# Patient Record
(demographics unavailable — no encounter records)

---

## 2019-08-17 NOTE — RAD
EXAM: 3 views left ankle

 

DATE: 8/17/2019 10:58 AM

 

INDICATION: Left ankle pain, trauma

 

COMPARISON: No Prior

 

FINDINGS:

No evidence of acute fracture or dislocation. Ankle mortise is congruent. 

Talar dome is intact. No ankle joint effusion. Mild soft tissue swelling 

about the left ankle.

 

IMPRESSION:

No evidence of acute fracture or dislocation.

 

Electronically signed by: Abdiaziz Sanchez MD (8/17/2019 12:44 PM) [facility]

## 2019-08-17 NOTE — PHYS DOC
Text


Text


Will obtain ankle x-ray. Will discharge home with crutches, splint and pain 

medication with work comp follow-up.





General


Chief Complaint:  MECHANICAL FALL


Stated Complaint:  FALL


Time Seen by MD:  10:50


Source:  patient


Exam Limitations:  no limitations





History of Present Illness


Initial Comments


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 

injury after missing a step and twisting the ED for rating ankle. Pain is 

localized to left ankle. Denies other injury or complaint. Injury occurred just 

prior to ED arrival.


Onset:  just prior to arrival


Severity:  moderate


Pain/Injury Location:  left ankle


Method of Injury:  fell, twisted


Modifying Factors:  improves with movement, improves with rest


Associated Symptoms:  none





Past Medical History


Medical History:  no pertinent history


Surgical History:  noncontributory


LMP (Females 10-50):  





Family History


Significant Family History:  no pertinent family hx





Review of Systems


Constitutional:  no symptoms reported


EENTM:  no symptoms reported


Respiratory:  no symptoms reported


Cardiovascular:  no symptoms reported


Gastrointestinal:  no symptoms reported


Genitourinary:  no symptoms reported


Musculoskeletal:  see HPI


Skin:  no symptoms reported


Psychiatric/Neurological:  no symptoms reported


All Other Systems:  Reviewed and Negative





Physical Exam


General Appearance:  WD/WN, moderate distress


Neck:  normal inspection


Ankles:  left ankle limited range of motion, left ankle pain, left ankle soft 

tissue tenderness, left ankle swelling


Feet:  left foot pain, left foot soft tissue tenderness, left foot swelling


Neurologic/Tendon:  normal sensation, normal motor functions


Psychiatric:  alert, oriented x 3


Skin:  normal color











CALE GUTIERREZ DO                   Aug 17, 2019 11:04

## 2020-09-28 NOTE — RAD
EXAM: Obstetrics sonogram.

 

HISTORY: Size and dates discrepancy.

 

TECHNIQUE: Sonographic imaging of a gravid uterus was performed.

 

COMPARISON: 6/5/2020.

 

FINDINGS: There is a single intrauterine fetus in breech presentation with

a normal heart rate of 137 bpm. There is a posterior grade 1 placenta 

without evidence of placenta previa. The cervix is closed and measures 4.2

cm in length. The amniotic fluid volume is normal. There is a three-vessel

umbilical cord.

 

The fetal heart, brain, spine, kidneys, stomach, bladder, facial profile 

and extremities are unremarkable. The biparietal diameter is 5.12 cm, 

corresponding with 21 weeks and 4 days. The head circumference is 19.11 

cm, corresponding with 21 weeks and 3 days. The abdominal circumference is

16.63 cm, corresponding with 21 weeks and 5 days. The femoral length is 

3.62 cm, corresponding with 21 weeks and 3 days.

 

The estimated gestational age based on combined ultrasound measurements is

21 weeks and 4 days and the estimated fetal weight is 433 g. This 

corresponds with the 14th percentile for a gestational age of 23 weeks and

5 days based on LMP.

 

IMPRESSION:

1. Single intrauterine fetus in breech presentation with normal heart rate

and gestational age based on ultrasound measurements of 21 weeks and 4 

days. The estimated fetal weight is at the 40th percentile for a 

gestational age of 23 weeks and 5 days based on LMP.

2. Unremarkable fetal anatomy survey.

 

Electronically signed by: Salena Cano MD (9/28/2020 5:30 PM) [id number]

## 2021-02-09 NOTE — PDOC1
OB - History


Hx of Present Pregnancy


Prenatal Care:  Good Care


Ultrasounds:  Normal mid trimester US


Obstetrical Complications:  Other (Oligohydramnios)


Medical Complications:  None





Past Family/Social History


*


Past Medical, Surgical, Family and Obstetric Histories reviewed from prenatal 

chart.


Rubella:  Immune


RPR/VDRL:  Negative


GBS Status:  Positive


HBsAG:  Negative





OB - Chief Complaint & HPI


Date of Admission:


Date of Admission:  2021 at 19:49


Chief Complaint/History


:  1


Para:  0


EGA:  40


Reason for admission:  induction of labor (oligohydramnios)


Admission Nurse Assessment Rev:  Yes





OB - Admission Exam


Physical Exam


Vitals:





                          VS - Last 72 Hours, by Label








  Date Time  Temp Pulse Resp B/P (MAP) Pulse Ox O2 Delivery O2 Flow Rate FiO2


 


21 21:29 98.2 103 18 142/74 (96)  Room Air  





 98.2       








HEENT:  Normal


Heart:  Regular Rate


Lungs:  Clear


Abdomen:  Gravid, Non tender, Soft


Extremities:  Edema


Reflexes:  Normal


Cervical Dilatation:  Fingertip


Effacement:  50%


Station:  -3


Membranes:  Intact


Accelerations:  Accelerations Present


Decelerations:  No decelerations


Contractions on Admission:  None


Text


A: 40 wks IUP


    Oligohydramnios


    GBS positive


P: Admit IOL cervidil, then pitocin in am with Pen G prophylaxis for GBS status.











KEYANNA ALVARENGA Jr, MD           2021 08:10

## 2021-02-10 NOTE — PDOC
VAGINAL DELIVERY


DATE


DATE: 2/10/21 


TIME: 13:50


:  1


Para:  1


EGA:  40


VAGINAL DELIVERY:  VTX


VACCUM ASSISTED:  No


PLACENTA:  Spontaneous


APGAR


8/9


SEX:  Female


WEIGHT


Weight [ 6 lbs. 8 oz ]


Nuchal Cord:  No


Amniotic Fluid:  Clear


PAIN:  Epidural


EPISIOTOMY:  No


EXTENSION:  Yes (2nd degree midline laceration and periurethral laceration)


REPAIRED WITH


2-0 vicryl and 3-0 chromic


EBL


300 ml


COMPLICATIONS


none


CONDITION


pt. stable


Signs of Intrauterine Infectio:  None


Shoulder Dystocia:  No











KEYANNA ALVARENGA Jr, MD          Feb 10, 2021 13:51

## 2021-02-11 NOTE — PDOC
OB Progress Note


Date of Service


21


Time of Evaluation


1055


Notes


Pt. feeling well.  No complaints.


Lab





Laboratory Tests








Test


 21


07:13


 


White Blood Count


 12.9 x10^3/uL


(4.0-11.0)


 


Red Blood Count


 3.63 x10^6/uL


(3.50-5.40)


 


Hemoglobin


 9.5 g/dL


(12.0-15.5)


 


Hematocrit


 29.9 %


(36.0-47.0)


 


Mean Corpuscular Volume 82 fL () 


 


Mean Corpuscular Hemoglobin 26 pg (25-35) 


 


Mean Corpuscular Hemoglobin


Concent 32 g/dL


(31-37)


 


Red Cell Distribution Width


 15.3 %


(11.5-14.5)


 


Platelet Count


 252 x10^3/uL


(140-400)


 


Neutrophils (%) (Auto) 63 % (31-73) 


 


Lymphocytes (%) (Auto) 27 % (24-48) 


 


Monocytes (%) (Auto) 10 % (0-9) 


 


Eosinophils (%) (Auto) 0 % (0-3) 


 


Basophils (%) (Auto) 1 % (0-3) 


 


Neutrophils # (Auto)


 8.1 x10^3/uL


(1.8-7.7)


 


Lymphocytes # (Auto)


 3.4 x10^3/uL


(1.0-4.8)


 


Monocytes # (Auto)


 1.2 x10^3/uL


(0.0-1.1)


 


Eosinophils # (Auto)


 0.0 x10^3/uL


(0.0-0.7)


 


Basophils # (Auto)


 0.1 x10^3/uL


(0.0-0.2)








Laboratory Tests








Test


 21


07:13


 


White Blood Count


 12.9 x10^3/uL


(4.0-11.0)


 


Red Blood Count


 3.63 x10^6/uL


(3.50-5.40)


 


Hemoglobin


 9.5 g/dL


(12.0-15.5)


 


Hematocrit


 29.9 %


(36.0-47.0)


 


Mean Corpuscular Volume 82 fL () 


 


Mean Corpuscular Hemoglobin 26 pg (25-35) 


 


Mean Corpuscular Hemoglobin


Concent 32 g/dL


(31-37)


 


Red Cell Distribution Width


 15.3 %


(11.5-14.5)


 


Platelet Count


 252 x10^3/uL


(140-400)


 


Neutrophils (%) (Auto) 63 % (31-73) 


 


Lymphocytes (%) (Auto) 27 % (24-48) 


 


Monocytes (%) (Auto) 10 % (0-9) 


 


Eosinophils (%) (Auto) 0 % (0-3) 


 


Basophils (%) (Auto) 1 % (0-3) 


 


Neutrophils # (Auto)


 8.1 x10^3/uL


(1.8-7.7)


 


Lymphocytes # (Auto)


 3.4 x10^3/uL


(1.0-4.8)


 


Monocytes # (Auto)


 1.2 x10^3/uL


(0.0-1.1)


 


Eosinophils # (Auto)


 0.0 x10^3/uL


(0.0-0.7)


 


Basophils # (Auto)


 0.1 x10^3/uL


(0.0-0.2)








Medications





Current Medications


Sodium Chloride (Normal Saline Flush) 3 ml QSHIFT  PRN IV AFTER MEDS AND BLOOD 

DRAWS;  Start 21 at 21:30


Ringer's Solution 1,000 ml @  125 mls/hr Q8H IV  Last administered on 2/10/21at 

10:04;  Start 21 at 21:30;  Stop 2/10/21 at 23:00;  Status DC


Fentanyl Citrate (Fentanyl 2ml Vial) 50 mcg PRN Q30MIN  PRN IVP Mild to moderate

pain Last administered on 2/10/21at 07:26;  Start 21 at 21:30


Fentanyl Citrate (Fentanyl 2ml Vial) 100 mcg PRN Q30MIN  PRN IVP Severe pain;  

Start 21 at 21:30


Acetaminophen (Tylenol) 650 mg PRN Q6HRS  PRN PO MILD PAIN / TEMP > 100.3'F Last

administered on 21at 03:36;  Start 21 at 21:30


Ondansetron HCl (Zofran) 4 mg PRN Q4HRS  PRN IVP NAUSEA/VOMITING;  Start 21 

at 21:30


Al Hydroxide/Mg Hydroxide (Mylanta Plus Xs) 30 ml PRN Q4HRS  PRN PO HEARTBURN / 

GAS;  Start 21 at 21:30


Terbutaline Sulfate (Brethine) 0.25 mg 1X PRN  PRN SQ SEE COMMENTS;  Start 

21 at 21:30;  Stop 21 at 21:29;  Status DC


Lidocaine HCl (Xylocaine 1% Pf 30ml Vial) 30 ml 1X PRN  PRN INJ SEE COMMENTS 

Last administered on 2/10/21at 15:51;  Start 21 at 21:30;  Stop 2/10/21 at 

21:29;  Status DC


Oxytocin 500 ml @ 0 mls/hr CONT  PRN IV SEE I/O RECORD Last administered on 

21at 07:27;  Start 21 at 21:30


Oxytocin 500 ml @ 0 mls/hr CONT PRN  PRN IV Post delivery bleeding;  Start 

21 at 21:30


Ibuprofen (Motrin) 800 mg PRN Q6HRS  PRN PO PAIN Last administered on 2/10/21at 

20:20;  Start 21 at 21:30


Penicillin G Potassium [id number] unit/Dextrose 100 ml @  100 mls/hr 1X  ONCE IV  

Last administered on 21at 07:25;  Start 21 at 21:30;  Stop 21 at 22:

29;  Status DC


Penicillin G Potassium [id number] unit/Dextrose 50 ml @  100 mls/hr Q4H IV  Last 

administered on 2/10/21at 10:46;  Start 21 at 01:30;  Stop 2/10/21 at 23:00;

 Status DC


Docusate Sodium (Enemeez) 283 mg PRN DAILY  PRN MA CONSTIPATION;  Start 21 

at 21:30


Dinoprostone (Cervidil) 10 mg 1X  ONCE VG  Last administered on 21at 21:38; 

Start 21 at 21:30;  Stop 21 at 21:31;  Status DC


Dinoprostone (Cervidil) 10 mg 1X  ONCE VG  Last administered on 21at 17:15; 

Start 21 at 17:00;  Stop 21 at 17:01;  Status DC


Oxytocin 500 ml @ 0 mls/hr 1X  ONCE IV  Last administered on 2/10/21at 06:13;  

Start 2/10/21 at 06:00;  Stop 2/10/21 at 06:01;  Status DC


Dinoprostone (Cervidil) 10 mg 1X  ONCE VG  Last administered on 21at 22:31; 

Start 21 at 22:30;  Stop 21 at 22:31;  Status DC


Diphenhydramine HCl (Benadryl) 50 mg PRN QHS  PRN PO INSOMNIA Last administered 

on 2/10/21at 01:19;  Start 2/10/21 at 01:15


Ropivacaine (Naropin 0.2%) 10 ml STK-MED ONCE .ROUTE ;  Start 2/10/21 at 09:06; 

Stop 2/10/21 at 09:07;  Status DC


Fentanyl Citrate 50 ml @ As Directed STK-MED ONCE .ROUTE ;  Start 2/10/21 at 

09:07;  Stop 2/10/21 at 09:07;  Status DC


Fentanyl Citrate 50 ml @ As Directed STK-MED ONCE .ROUTE ;  Start 2/10/21 at 

12:31;  Stop 2/10/21 at 12:32;  Status DC


Sodium Chloride (Normal Saline Flush) 10 ml QSHIFT  PRN IV AFTER MEDS AND BLOOD 

DRAWS;  Start 2/10/21 at 14:00


Oxytocin 500 ml @  62.5 mls/hr CONT  PRN IV SEE I/O RECORD;  Start 2/10/21 at 

14:00;  Stop 2/10/21 at 21:59;  Status DC


Acetaminophen (Tylenol) 650 mg PRN Q6HRS  PRN PO MILD PAIN / TEMP > 100.3'F;  

Start 2/10/21 at 14:00


Ibuprofen (Motrin) 800 mg PRN Q8HRS  PRN PO INFLAMMATION/PAIN PREVENTION;  Start

2/10/21 at 14:00


Docusate Sodium (Colace) 100 mg PRN BID  PRN PO CONSTIPATION;  Start 2/10/21 at 

14:00


Magnesium Hydroxide (Milk Of Magnesia) 2,400 mg PRN DAILY  PRN PO CONSTIPATION; 

Start 2/10/21 at 14:00


Al Hydroxide/Mg Hydroxide (Mylanta Plus Xs) 30 ml PRN Q4HRS  PRN PO HEARTBURN / 

GAS;  Start 2/10/21 at 14:00


Simethicone (Gas-X) 80 mg PRN AFTMEALHC  PRN PO GAS / BLOATING;  Start 2/10/21 

at 14:00


Diphenhydramine HCl (Benadryl) 25 mg PRN Q6HRS  PRN PO ITCHING;  Start 2/10/21 

at 14:00


Benzocaine (Americaine) 1 spray PRN QID  PRN TP TOPICAL PAIN;  Start 2/10/21 at 

14:00


Phenyleph/Shark Oil/Min Oil/Petrol (Preparation H) 1 kirk PRN QID  PRN RC RECTAL 

PAIN;  Start 2/10/21 at 14:00


Hydrocortisone (Cortaid) 1 kirk PRN QID  PRN TP PERINEAL PAIN;  Start 2/10/21 at 

14:00


Ferrous Sulfate (Feosol) 325 mg BIDWMEALS PO  Last administered on 21at 

09:14;  Start 21 at 08:00


Zolpidem Tartrate (Ambien) 5 mg PRN QHS  PRN PO INSOMNIA, MAY REPEAT X1;  Start 

2/10/21 at 14:00


Info (Do NOT chart on this placeholder) 1 ea 1X PRN  PRN MC SEE COMMENTS;  Start

2/10/21 at 14:00


Info (Do NOT chart on this placeholder) 1 ea 1X PRN  PRN MC SEE COMMENTS;  Start

2/10/21 at 14:00


Oxycodone/ Acetaminophen (Percocet 5/325) 2 tab PRN Q4HRS  PRN PO MODERATE PAIN,

SEVERE PAIN;  Start 2/10/21 at 14:00


Multivitamins (Thera M Plus) 1 tab DAILY PO ;  Start 21 at 09:00





Active Scripts


Active


Tramadol Hcl 50 Mg Tablet 50 Mg PO Q6HRS PRN


Exam


Abd: soft, non tender, fundus firm


Assessment


PPD#1 s/p 


Plan of Care:  Continue current Tx, Mgmt











KEYANNA ALVARENGA Jr, MD          2021 10:57

## 2021-02-12 NOTE — DISCH
DISCHARGE INSTRUCTIONS


Condition on Discharge


Condition on Discharge:  Stable





Activity After Discharge


Activity Instructions for Disc:  Activity as tolerated


Lifting Instructions after Dis:  No heavy lifting


Driving Instructions after Dis:  Do not drive today





Diet after Discharge


Diet after Discharge:  Regular





Contacting the [facility] after DC


Call your doctor for:  Concerns you may have





Follow-Up


Follow up with:  Dr. Thacker in 6 wks











KEYANNA THACKER Jr, MD          Feb 12, 2021 08:00

## 2021-02-12 NOTE — PDOC3
OB DISCHARGE SUMMARY


DATE OF ADMISSION:  


2/9/21


DATE OF DISCHARGE:  


2/12/21


REASON FOR ADMISSION:   Induction of labor (oligohydramnios)


INTRAPARTUM PROCEDURES:  Spontanous Vag Deliv


DISCHARGE DIAGNOSIS:  Term Pregnancy Delivered


DISCHARGE INFORMATION:  Activity (ad veena), Diet (regular), Instructions (pelvic 

rest x 6 wks)


HOSPITAL COURSE


Term gestation delivered vaginally without complications.











KEYANNA ALVARENGA Jr, MD          Feb 12, 2021 07:57